# Patient Record
Sex: FEMALE | Race: WHITE | Employment: FULL TIME | URBAN - METROPOLITAN AREA
[De-identification: names, ages, dates, MRNs, and addresses within clinical notes are randomized per-mention and may not be internally consistent; named-entity substitution may affect disease eponyms.]

---

## 2021-03-17 ENCOUNTER — TRANSCRIBE ORDER (OUTPATIENT)
Dept: SCHEDULING | Age: 46
End: 2021-03-17

## 2021-03-17 DIAGNOSIS — Z12.31 VISIT FOR SCREENING MAMMOGRAM: Primary | ICD-10-CM

## 2021-04-09 ENCOUNTER — HOSPITAL ENCOUNTER (OUTPATIENT)
Dept: MAMMOGRAPHY | Age: 46
Discharge: HOME | End: 2021-04-09
Attending: FAMILY MEDICINE

## 2021-04-09 DIAGNOSIS — Z12.31 VISIT FOR SCREENING MAMMOGRAM: ICD-10-CM

## 2021-04-09 PROCEDURE — 77067 SCR MAMMO BI INCL CAD: CPT

## 2021-04-15 ENCOUNTER — HOSPITAL ENCOUNTER (OUTPATIENT)
Dept: MAMMOGRAPHY | Age: 46
Discharge: HOME | End: 2021-04-15
Attending: FAMILY MEDICINE

## 2021-04-15 DIAGNOSIS — R92.8 ABNORMAL SCREENING MAMMOGRAM: ICD-10-CM

## 2021-04-15 PROCEDURE — 77065 DX MAMMO INCL CAD UNI: CPT

## 2021-04-15 PROCEDURE — 76642 ULTRASOUND BREAST LIMITED: CPT

## 2021-09-09 ENCOUNTER — HOSPITAL ENCOUNTER (OUTPATIENT)
Dept: CT IMAGING | Age: 46
Discharge: HOME | End: 2021-09-09
Attending: STUDENT IN AN ORGANIZED HEALTH CARE EDUCATION/TRAINING PROGRAM
Payer: COMMERCIAL

## 2021-09-09 DIAGNOSIS — G43.909 MIGRAINE SYNDROME: ICD-10-CM

## 2021-09-09 DIAGNOSIS — J34.89 NASAL OBSTRUCTION: ICD-10-CM

## 2021-09-09 PROCEDURE — 70486 CT MAXILLOFACIAL W/O DYE: CPT

## 2021-09-24 DIAGNOSIS — J32.9 CHRONIC RHINOSINUSITIS: Primary | ICD-10-CM

## 2021-09-24 DIAGNOSIS — J34.2 DEVIATED NASAL SEPTUM: ICD-10-CM

## 2021-09-24 DIAGNOSIS — J34.89 NASAL OBSTRUCTION: ICD-10-CM

## 2021-09-24 DIAGNOSIS — J31.0 CHRONIC RHINOSINUSITIS: Primary | ICD-10-CM

## 2021-09-24 DIAGNOSIS — J34.3 NASAL TURBINATE HYPERTROPHY: ICD-10-CM

## 2021-10-01 ENCOUNTER — HOSPITAL ENCOUNTER (OUTPATIENT)
Dept: SURGERY | Age: 46
Discharge: HOME | End: 2021-10-01

## 2021-10-05 VITALS — WEIGHT: 150 LBS | BODY MASS INDEX: 26.58 KG/M2 | HEIGHT: 63 IN

## 2021-10-05 RX ORDER — CETIRIZINE HYDROCHLORIDE 10 MG/1
10 CAPSULE, LIQUID FILLED ORAL
COMMUNITY

## 2021-10-05 NOTE — PERIOP NOTES
Patient verified name and . Order for consent IS found in EHR and matches case posting; patient verifies procedure. Type 1B surgery, Phone assessment complete. Orders were received. Labs per surgeon: none  Labs per anesthesia protocol: none    Patient COVID test date 10/5/21 1145. The testing center is located at the [facility] [address] [place] [address]. If appointment is needed-patient provided telephone number of [phone]. Patient answered medical/surgical history questions at their best of ability. All prior to admission medications documented in [facility]. Patient instructed to take the following medications the day of surgery according to anesthesia guidelines with a small sip of water: flonase prn On the day before surgery please take Acetaminophen 1000mg in the morning and then again before bed. You may substitute for Tylenol 650 mg. Hold all vitamins 7 days prior to surgery and NSAIDS 5 days prior to surgery. Prescription meds to hold:none        Patient instructed on the following:    > Arrive at [facility], time of arrival to be called the day before by 1700  > NPO after midnight including gum, mints, and ice chips  > Responsible adult must drive patient to the hospital, stay during surgery, and patient will need supervision 24 hours after anesthesia  > Use antibacterial soap in shower the night before surgery and on the morning of surgery  > All piercings must be removed prior to arrival.    > Leave all valuables (money and jewelry) at home but bring insurance card and ID on DOS.   > Do not wear make-up, nail polish, lotions, cologne, perfumes, powders, or oil on skin. Artificial nails are not permitted.

## 2021-10-07 ENCOUNTER — ANESTHESIA EVENT (OUTPATIENT)
Dept: SURGERY | Age: 46
End: 2021-10-07
Payer: COMMERCIAL

## 2021-10-07 NOTE — ANESTHESIA PREPROCEDURE EVALUATION
Relevant Problems   No relevant active problems       Anesthetic History   No history of anesthetic complications            Review of Systems / Medical History  Patient summary reviewed and pertinent labs reviewed    Pulmonary  Within defined limits                 Neuro/Psych         Headaches (migraines)     Cardiovascular  Within defined limits                Exercise tolerance: >4 METS     GI/Hepatic/Renal  Within defined limits              Endo/Other  Within defined limits           Other Findings                   Anesthetic Plan    ASA: 1  Anesthesia type: general          Induction: Intravenous  Anesthetic plan and risks discussed with: Patient

## 2021-10-08 ENCOUNTER — HOSPITAL ENCOUNTER (OUTPATIENT)
Age: 46
Setting detail: OUTPATIENT SURGERY
Discharge: HOME | End: 2021-10-08
Attending: STUDENT IN AN ORGANIZED HEALTH CARE EDUCATION/TRAINING PROGRAM | Admitting: STUDENT IN AN ORGANIZED HEALTH CARE EDUCATION/TRAINING PROGRAM
Payer: COMMERCIAL

## 2021-10-08 ENCOUNTER — ANESTHESIA (OUTPATIENT)
Dept: SURGERY | Age: 46
End: 2021-10-08
Payer: COMMERCIAL

## 2021-10-08 VITALS
OXYGEN SATURATION: 98 % | TEMPERATURE: 97.3 F | HEART RATE: 71 BPM | DIASTOLIC BLOOD PRESSURE: 70 MMHG | HEIGHT: 63 IN | SYSTOLIC BLOOD PRESSURE: 137 MMHG | WEIGHT: 147 LBS | BODY MASS INDEX: 26.05 KG/M2 | RESPIRATION RATE: 16 BRPM

## 2021-10-08 DIAGNOSIS — J31.0 CHRONIC RHINOSINUSITIS: ICD-10-CM

## 2021-10-08 DIAGNOSIS — J32.9 CHRONIC RHINOSINUSITIS: ICD-10-CM

## 2021-10-08 DIAGNOSIS — J34.3 NASAL TURBINATE HYPERTROPHY: ICD-10-CM

## 2021-10-08 DIAGNOSIS — J34.89 NASAL OBSTRUCTION: ICD-10-CM

## 2021-10-08 DIAGNOSIS — J34.2 DEVIATED NASAL SEPTUM: ICD-10-CM

## 2021-10-08 PROCEDURE — 74011000250 HC RX REV CODE- 250: Performed by: NURSE ANESTHETIST, CERTIFIED REGISTERED

## 2021-10-08 PROCEDURE — 77030018390 HC SPNG HEMSTAT2 J&J -B: Performed by: STUDENT IN AN ORGANIZED HEALTH CARE EDUCATION/TRAINING PROGRAM

## 2021-10-08 PROCEDURE — 76060000035 HC ANESTHESIA 2 TO 2.5 HR: Performed by: STUDENT IN AN ORGANIZED HEALTH CARE EDUCATION/TRAINING PROGRAM

## 2021-10-08 PROCEDURE — 77030018836 HC SOL IRR NACL ICUM -A: Performed by: STUDENT IN AN ORGANIZED HEALTH CARE EDUCATION/TRAINING PROGRAM

## 2021-10-08 PROCEDURE — 77030040922 HC BLNKT HYPOTHRM STRY -A: Performed by: ANESTHESIOLOGY

## 2021-10-08 PROCEDURE — 30140 RESECT INFERIOR TURBINATE: CPT | Performed by: STUDENT IN AN ORGANIZED HEALTH CARE EDUCATION/TRAINING PROGRAM

## 2021-10-08 PROCEDURE — 77030037088 HC TUBE ENDOTRACH ORAL NSL COVD-A: Performed by: ANESTHESIOLOGY

## 2021-10-08 PROCEDURE — 77030008357 HC SPLNT NSL INT THOM -B: Performed by: STUDENT IN AN ORGANIZED HEALTH CARE EDUCATION/TRAINING PROGRAM

## 2021-10-08 PROCEDURE — 77030008528 HC TBNG IRR MIC/DEB MEDT -B: Performed by: STUDENT IN AN ORGANIZED HEALTH CARE EDUCATION/TRAINING PROGRAM

## 2021-10-08 PROCEDURE — 77030039425 HC BLD LARYNG TRULITE DISP TELE -A: Performed by: ANESTHESIOLOGY

## 2021-10-08 PROCEDURE — 74011000250 HC RX REV CODE- 250: Performed by: STUDENT IN AN ORGANIZED HEALTH CARE EDUCATION/TRAINING PROGRAM

## 2021-10-08 PROCEDURE — 74011250636 HC RX REV CODE- 250/636: Performed by: ANESTHESIOLOGY

## 2021-10-08 PROCEDURE — 77030041022 HC TRKR INSTR ENT NAV MEDT -C: Performed by: STUDENT IN AN ORGANIZED HEALTH CARE EDUCATION/TRAINING PROGRAM

## 2021-10-08 PROCEDURE — 31295 NSL/SINS NDSC SURG MAX SINS: CPT | Performed by: STUDENT IN AN ORGANIZED HEALTH CARE EDUCATION/TRAINING PROGRAM

## 2021-10-08 PROCEDURE — 74011250637 HC RX REV CODE- 250/637: Performed by: STUDENT IN AN ORGANIZED HEALTH CARE EDUCATION/TRAINING PROGRAM

## 2021-10-08 PROCEDURE — 61782 SCAN PROC CRANIAL EXTRA: CPT | Performed by: STUDENT IN AN ORGANIZED HEALTH CARE EDUCATION/TRAINING PROGRAM

## 2021-10-08 PROCEDURE — 77030040361 HC SLV COMPR DVT MDII -B: Performed by: STUDENT IN AN ORGANIZED HEALTH CARE EDUCATION/TRAINING PROGRAM

## 2021-10-08 PROCEDURE — 77030006907 HC BLD SNUS SHV MEDT -C: Performed by: STUDENT IN AN ORGANIZED HEALTH CARE EDUCATION/TRAINING PROGRAM

## 2021-10-08 PROCEDURE — 77030036727 HC DEV INFL BLN SNUS SE DISP ACCL -B: Performed by: STUDENT IN AN ORGANIZED HEALTH CARE EDUCATION/TRAINING PROGRAM

## 2021-10-08 PROCEDURE — 77030002888 HC SUT CHRMC J&J -A: Performed by: STUDENT IN AN ORGANIZED HEALTH CARE EDUCATION/TRAINING PROGRAM

## 2021-10-08 PROCEDURE — 76210000006 HC OR PH I REC 0.5 TO 1 HR: Performed by: STUDENT IN AN ORGANIZED HEALTH CARE EDUCATION/TRAINING PROGRAM

## 2021-10-08 PROCEDURE — 2709999900 HC NON-CHARGEABLE SUPPLY: Performed by: STUDENT IN AN ORGANIZED HEALTH CARE EDUCATION/TRAINING PROGRAM

## 2021-10-08 PROCEDURE — A4270 DISPOSABLE ENDOSCOPE SHEATH: HCPCS | Performed by: STUDENT IN AN ORGANIZED HEALTH CARE EDUCATION/TRAINING PROGRAM

## 2021-10-08 PROCEDURE — 74011250636 HC RX REV CODE- 250/636: Performed by: NURSE ANESTHETIST, CERTIFIED REGISTERED

## 2021-10-08 PROCEDURE — 77030019908 HC STETH ESOPH SIMS -A: Performed by: ANESTHESIOLOGY

## 2021-10-08 PROCEDURE — 77030002916 HC SUT ETHLN J&J -A: Performed by: STUDENT IN AN ORGANIZED HEALTH CARE EDUCATION/TRAINING PROGRAM

## 2021-10-08 PROCEDURE — 74011250637 HC RX REV CODE- 250/637: Performed by: ANESTHESIOLOGY

## 2021-10-08 PROCEDURE — 76010000131 HC OR TIME 2 TO 2.5 HR: Performed by: STUDENT IN AN ORGANIZED HEALTH CARE EDUCATION/TRAINING PROGRAM

## 2021-10-08 PROCEDURE — 31296 NSL/SINS NDSC SURG FRNT SINS: CPT | Performed by: STUDENT IN AN ORGANIZED HEALTH CARE EDUCATION/TRAINING PROGRAM

## 2021-10-08 PROCEDURE — 76210000020 HC REC RM PH II FIRST 0.5 HR: Performed by: STUDENT IN AN ORGANIZED HEALTH CARE EDUCATION/TRAINING PROGRAM

## 2021-10-08 PROCEDURE — 77030006908 HC BLD SNUS SHV MEDT -D: Performed by: STUDENT IN AN ORGANIZED HEALTH CARE EDUCATION/TRAINING PROGRAM

## 2021-10-08 PROCEDURE — 30520 REPAIR OF NASAL SEPTUM: CPT | Performed by: STUDENT IN AN ORGANIZED HEALTH CARE EDUCATION/TRAINING PROGRAM

## 2021-10-08 RX ORDER — PROPOFOL 10 MG/ML
INJECTION, EMULSION INTRAVENOUS AS NEEDED
Status: DISCONTINUED | OUTPATIENT
Start: 2021-10-08 | End: 2021-10-08 | Stop reason: HOSPADM

## 2021-10-08 RX ORDER — ONDANSETRON 4 MG/1
4 TABLET, ORALLY DISINTEGRATING ORAL
Status: COMPLETED | OUTPATIENT
Start: 2021-10-08 | End: 2021-10-08

## 2021-10-08 RX ORDER — LIDOCAINE HYDROCHLORIDE AND EPINEPHRINE 10; 10 MG/ML; UG/ML
INJECTION, SOLUTION INFILTRATION; PERINEURAL AS NEEDED
Status: DISCONTINUED | OUTPATIENT
Start: 2021-10-08 | End: 2021-10-08 | Stop reason: HOSPADM

## 2021-10-08 RX ORDER — EPHEDRINE SULFATE/0.9% NACL/PF 50 MG/5 ML
SYRINGE (ML) INTRAVENOUS AS NEEDED
Status: DISCONTINUED | OUTPATIENT
Start: 2021-10-08 | End: 2021-10-08 | Stop reason: HOSPADM

## 2021-10-08 RX ORDER — NEOSTIGMINE METHYLSULFATE 1 MG/ML
INJECTION, SOLUTION INTRAVENOUS AS NEEDED
Status: DISCONTINUED | OUTPATIENT
Start: 2021-10-08 | End: 2021-10-08 | Stop reason: HOSPADM

## 2021-10-08 RX ORDER — GLYCOPYRROLATE 0.2 MG/ML
INJECTION INTRAMUSCULAR; INTRAVENOUS AS NEEDED
Status: DISCONTINUED | OUTPATIENT
Start: 2021-10-08 | End: 2021-10-08 | Stop reason: HOSPADM

## 2021-10-08 RX ORDER — MIDAZOLAM HYDROCHLORIDE 1 MG/ML
2 INJECTION, SOLUTION INTRAMUSCULAR; INTRAVENOUS
Status: COMPLETED | OUTPATIENT
Start: 2021-10-08 | End: 2021-10-08

## 2021-10-08 RX ORDER — DEXAMETHASONE SODIUM PHOSPHATE 4 MG/ML
INJECTION, SOLUTION INTRA-ARTICULAR; INTRALESIONAL; INTRAMUSCULAR; INTRAVENOUS; SOFT TISSUE AS NEEDED
Status: DISCONTINUED | OUTPATIENT
Start: 2021-10-08 | End: 2021-10-08 | Stop reason: HOSPADM

## 2021-10-08 RX ORDER — LIDOCAINE HYDROCHLORIDE 20 MG/ML
INJECTION, SOLUTION EPIDURAL; INFILTRATION; INTRACAUDAL; PERINEURAL AS NEEDED
Status: DISCONTINUED | OUTPATIENT
Start: 2021-10-08 | End: 2021-10-08 | Stop reason: HOSPADM

## 2021-10-08 RX ORDER — HYDROMORPHONE HYDROCHLORIDE 2 MG/ML
0.5 INJECTION, SOLUTION INTRAMUSCULAR; INTRAVENOUS; SUBCUTANEOUS
Status: DISCONTINUED | OUTPATIENT
Start: 2021-10-08 | End: 2021-10-08 | Stop reason: HOSPADM

## 2021-10-08 RX ORDER — ROCURONIUM BROMIDE 10 MG/ML
INJECTION, SOLUTION INTRAVENOUS AS NEEDED
Status: DISCONTINUED | OUTPATIENT
Start: 2021-10-08 | End: 2021-10-08 | Stop reason: HOSPADM

## 2021-10-08 RX ORDER — SODIUM CHLORIDE, SODIUM LACTATE, POTASSIUM CHLORIDE, CALCIUM CHLORIDE 600; 310; 30; 20 MG/100ML; MG/100ML; MG/100ML; MG/100ML
75 INJECTION, SOLUTION INTRAVENOUS CONTINUOUS
Status: DISCONTINUED | OUTPATIENT
Start: 2021-10-08 | End: 2021-10-08 | Stop reason: HOSPADM

## 2021-10-08 RX ORDER — ONDANSETRON 2 MG/ML
INJECTION INTRAMUSCULAR; INTRAVENOUS AS NEEDED
Status: DISCONTINUED | OUTPATIENT
Start: 2021-10-08 | End: 2021-10-08 | Stop reason: HOSPADM

## 2021-10-08 RX ORDER — HYDROCODONE BITARTRATE AND ACETAMINOPHEN 5; 325 MG/1; MG/1
2 TABLET ORAL AS NEEDED
Status: DISCONTINUED | OUTPATIENT
Start: 2021-10-08 | End: 2021-10-08 | Stop reason: HOSPADM

## 2021-10-08 RX ORDER — FENTANYL CITRATE 50 UG/ML
100 INJECTION, SOLUTION INTRAMUSCULAR; INTRAVENOUS ONCE
Status: DISCONTINUED | OUTPATIENT
Start: 2021-10-08 | End: 2021-10-08 | Stop reason: HOSPADM

## 2021-10-08 RX ORDER — LIDOCAINE HYDROCHLORIDE 10 MG/ML
0.1 INJECTION INFILTRATION; PERINEURAL AS NEEDED
Status: DISCONTINUED | OUTPATIENT
Start: 2021-10-08 | End: 2021-10-08 | Stop reason: HOSPADM

## 2021-10-08 RX ORDER — OXYCODONE HYDROCHLORIDE 5 MG/1
5 TABLET ORAL
Status: COMPLETED | OUTPATIENT
Start: 2021-10-08 | End: 2021-10-08

## 2021-10-08 RX ORDER — OXYMETAZOLINE HCL 0.05 %
SPRAY, NON-AEROSOL (ML) NASAL AS NEEDED
Status: DISCONTINUED | OUTPATIENT
Start: 2021-10-08 | End: 2021-10-08 | Stop reason: HOSPADM

## 2021-10-08 RX ORDER — FENTANYL CITRATE 50 UG/ML
INJECTION, SOLUTION INTRAMUSCULAR; INTRAVENOUS AS NEEDED
Status: DISCONTINUED | OUTPATIENT
Start: 2021-10-08 | End: 2021-10-08 | Stop reason: HOSPADM

## 2021-10-08 RX ADMIN — ROCURONIUM BROMIDE 35 MG: 10 INJECTION, SOLUTION INTRAVENOUS at 07:39

## 2021-10-08 RX ADMIN — LIDOCAINE HYDROCHLORIDE 5 MG: 20 INJECTION, SOLUTION EPIDURAL; INFILTRATION; INTRACAUDAL; PERINEURAL at 07:39

## 2021-10-08 RX ADMIN — Medication 3 MG: at 09:39

## 2021-10-08 RX ADMIN — FENTANYL CITRATE 50 MCG: 50 INJECTION INTRAMUSCULAR; INTRAVENOUS at 08:52

## 2021-10-08 RX ADMIN — PROPOFOL 200 MG: 10 INJECTION, EMULSION INTRAVENOUS at 07:39

## 2021-10-08 RX ADMIN — SODIUM CHLORIDE, SODIUM LACTATE, POTASSIUM CHLORIDE, AND CALCIUM CHLORIDE 75 ML/HR: 600; 310; 30; 20 INJECTION, SOLUTION INTRAVENOUS at 06:12

## 2021-10-08 RX ADMIN — ONDANSETRON 4 MG: 4 TABLET, ORALLY DISINTEGRATING ORAL at 11:08

## 2021-10-08 RX ADMIN — SODIUM CHLORIDE, SODIUM LACTATE, POTASSIUM CHLORIDE, AND CALCIUM CHLORIDE: 600; 310; 30; 20 INJECTION, SOLUTION INTRAVENOUS at 09:22

## 2021-10-08 RX ADMIN — Medication 10 MG: at 08:14

## 2021-10-08 RX ADMIN — Medication 5 MG: at 08:10

## 2021-10-08 RX ADMIN — DEXAMETHASONE SODIUM PHOSPHATE 8 MG: 4 INJECTION, SOLUTION INTRAMUSCULAR; INTRAVENOUS at 07:45

## 2021-10-08 RX ADMIN — Medication 10 MG: at 08:42

## 2021-10-08 RX ADMIN — OXYCODONE 5 MG: 5 TABLET ORAL at 10:13

## 2021-10-08 RX ADMIN — ONDANSETRON 4 MG: 2 INJECTION INTRAMUSCULAR; INTRAVENOUS at 08:53

## 2021-10-08 RX ADMIN — MIDAZOLAM 2 MG: 1 INJECTION INTRAMUSCULAR; INTRAVENOUS at 07:02

## 2021-10-08 RX ADMIN — HYDROMORPHONE HYDROCHLORIDE 0.5 MG: 2 INJECTION, SOLUTION INTRAMUSCULAR; INTRAVENOUS; SUBCUTANEOUS at 10:13

## 2021-10-08 RX ADMIN — FENTANYL CITRATE 50 MCG: 50 INJECTION INTRAMUSCULAR; INTRAVENOUS at 07:39

## 2021-10-08 RX ADMIN — GLYCOPYRROLATE 0.4 MG: 0.2 INJECTION, SOLUTION INTRAMUSCULAR; INTRAVENOUS at 09:39

## 2021-10-08 RX ADMIN — Medication 5 MG: at 08:06

## 2021-10-08 NOTE — OP NOTES
[address] [address]  OPERATIVE REPORT    Name:  Mary Jane Jackson  MR#:  [id number]  :  1975  ACCOUNT #:  [de-identified]  DATE OF SERVICE:  10/08/2021    PREOPERATIVE DIAGNOSES:  Nasal obstruction, deviated nasal septum, turbinate hypertrophy, chronic rhinosinusitis. POSTOPERATIVE DIAGNOSES:  Nasal obstruction, deviated nasal septum, turbinate hypertrophy, chronic rhinosinusitis. PROCEDURES PERFORMED:  Septoplasty, bilateral submucous resection of the inferior turbinates, bilateral functional endoscopic sinus surgery with image navigation, bilateral frontal balloon sinuplasty, bilateral maxillary balloon sinuplasty. SURGEON:  Lex Carrero DO    ASSISTANT SURGEON:  None. ANESTHESIA:  General anesthesia with endotracheal tube, 8 mL of 1% lidocaine with 1:100,000 epinephrine injection. COMPLICATIONS:  None. SPECIMENS REMOVED:  None. IMPLANTS:  none. ESTIMATED BLOOD LOSS:  20 mL. FINDINGS:  Significant anterior septal columellar deflection to the left and posterior bony deviation to the right. Bilateral congestion, narrowing, and edema of the middle meatus and frontal recess. DISPOSITION:  Stable to PACU. INDICATION FOR PROCEDURE:  This is a 78-year-old female with very longstanding history of nasal airway obstruction which has been worsening over the last several years and has not improved with medical therapy. She has also had chronic sinus pain, pressure, and previous history of recurrent sinusitis. She was deemed to have a significant anterior septal deviation and turbinate hypertrophy and some mild inflammatory edematous changes noted to her maxillary outflow tracts, infundibulums, and frontal recesses on her CT imaging. Therefore, all risks, benefits, and alternatives to surgical intervention were reviewed. Informed consent was obtained and signed. She was scheduled for the operating room.     DESCRIPTION OF PROCEDURE:  The patient was brought from the preoperative waiting area to the operating room and laid supine on the operating room table by the anesthesia team.  General anesthesia was induced. Endotracheal tube was placed. A time-out was then performed. She was then prepped and draped in the usual sterile fashion. A total of 8 mL of 1% lidocaine with 1:100,000 epinephrine was injected into the bilateral nasal septal mucosa and Afrin-soaked nasal pledgets were placed into the bilateral nasal cavities and after sufficient amount of time, inspection of the left nostril revealed a significant deflection of the columella cartilage to the left side with nasal airway obstruction. Therefore, a hemitransfixion incision was obtained over the anterior most aspect of the quadrangular cartilage and columella using a 15 blade. This was taken down to the level of the submucoperichondrium where the entire anterior portion of the columella cartilage was skeletonized and visualized and the anterior 2-3 mm worth of cartilaginous tissue was then cut with a straight scissor and 15 blade scalpel and trimmed off in appropriate segments to ensure there was enough residual columellar cartilage remaining as a strut to support the nasal cavity vault and tip of the nose. Next, a continuation of the submucoperichondrium was raised on the left side using a Avtar elevator beyond the bony cartilaginous junction. Next, a cut was then made through the quadrangular cartilage using the 15 blade ensuring at least 1 cm anterior strut and a submucoperichondrial plane was then raised on the right side using the [place] elevator. Next, the nasal speculum was placed on either side of the nasal septum quadrangular cartilage and a wide portion of cartilage was removed with Ashwin double action forceps and Almas forceps.   Next, the bony deflection towards the right was identified at the vomer and perpendicular plane of the ethmoid bone which was trimmed in a piece-wise fashion with Ashwin double action forceps and removed with Donaldo Patricio forceps. Next, crest cartilage and bone was then removed with Cande Garter and mallet and chisel. Next, re-evaluation showed that there was significant improvement in the nasal airways bilaterally. At this time, endoscopic visualization was then performed to the bilateral nasal cavities which showed early inflammatory changes and congestion of the bilateral middle meatus and uncinate processes first directed on the right side. The uncinate process, infundibulum, and maxillary ostium location was confirmed under image navigation and the sinonasal ostial dilation equipment device was brought into the surgical field and appropriately placed into the right maxillary sinus infundibulum where the tracer probe was identified and confirmed to be within the maxillary cheek sinus on transcutaneous illumination. Next, the balloon was advanced and increased pressure up to 1200 mmHg for 2 seconds and relieved. Next, several hundred milliliters of sterile saline were then used to irrigate the right maxillary sinus and the balloon device was then removed. Next, very similarly on the left side, the sinonasal balloon device equipment was brought into the surgical field on the left and the left maxillary infundibulum had congestion, narrowing with uncinate process, inflammation and the maxillary infundibulum was confirmed under image navigation and the balloon wire tracer was advanced within the infundibulum and confirmed the transilluminate within the left maxillary sinus and the balloon was advanced and dilated up to sufficient pressure for 2 seconds and relieved. Next, several hundred milliliters of sterile saline were then used to irrigate the left maxillary sinus. The sinonasal balloon device was then removed.   Next, very similarly on both sides, the frontal balloon device was brought into the surgical field and the bilateral frontal and ethmoid recesses were confirmed under image  navigation and the balloon was then advanced through the recess and into the frontal sinuses. The transcutaneous tracer illuminated to be confirmed within the frontal sinus forehead on both sides. The balloon was then brought up to a sufficient pressure and held for 2 seconds and relieved and several hundred milliliter of sterile saline were then used to irrigate the bilateral frontal sinuses. Next, the balloon device was brought on the surgical field and re-evaluation was performed which revealed sufficient dilated ostium to the bilateral maxillary sinuses and frontal recesses. Next the anterior face of the bilateral inferior turbinates was approached with a single stab incision of a 15 blade and a submucosal plane was raised with a caudal elevator. Then into the bilateral inferior turbinates a submucosal volumetric tissue reduction was performed using the small microdebrider. There was a significant improvement to the nasal airways bilaterally. There is outfracture then performed to the inferior turbinates using a Anna elevator. Next, the central septal pocket was then addressed, cleared of any blood clots and the left hemitransfixion incision was closed being reapproximated with a simple interrupted 4-0 chromic suture. Next, the septal mucosal pockets were reapproximated using a running 4-0 plain gut suture in a mattress style fashion. Next, Mario splints coated in antibiotic ointment were placed on either side of the nasal septum and sutured into place with a single transcolumellar Prolene suture. At this time, the care of the patient was transferred back over to Anesthesia where she awoke from anesthesia with no complications and was extubated successfully. She was then transferred to PACU in stable condition.       [address] [place] [address]      [id number]  D:  10/08/2021 11:38  T:  10/08/2021 17:24  JOB #:  [id number]

## 2021-10-08 NOTE — ANESTHESIA POSTPROCEDURE EVALUATION
Procedure(s):  SEPTOPLASTY  BILATERAL SUBMUCOUS RESECTION INFERIOR TURBINATES  BILATERAL FUNCTIONAL  ENDOSCOPIC SINUS SURGERY WITH IMAGE NAVIGATION  BILATERAL MAXILLARY BALLOON SINUPLASTY, BILATERAL FRONTAL BALLOON SINUPLASTY.     general    Anesthesia Post Evaluation      Multimodal analgesia: multimodal analgesia used between 6 hours prior to anesthesia start to PACU discharge  Patient location during evaluation: bedside  Patient participation: complete - patient participated  Level of consciousness: awake and alert  Pain management: adequate  Airway patency: patent  Anesthetic complications: no  Cardiovascular status: acceptable  Respiratory status: acceptable  Hydration status: acceptable  Post anesthesia nausea and vomiting:  controlled  Final Post Anesthesia Temperature Assessment:  Normothermia (36.0-37.5 degrees C)      INITIAL Post-op Vital signs:   Vitals Value Taken Time   /70 10/08/21 1042   Temp 36.3 °C (97.3 °F) 10/08/21 0956   Pulse 71 10/08/21 1042   Resp 16 10/08/21 1042   SpO2 98 % 10/08/21 1042

## 2021-10-08 NOTE — DISCHARGE INSTRUCTIONS
Nasal/Sinus Surgery    Post-op Instructions:    No blowing your nose for 1 week. No exercising, high exertion, heavy lifting, bending over for 1 week  You have splints in your nose and you will need to keep your nose moist with saline spray at minimum every 2-3 hours while you are awake. Do not blow your nose after using saline. You can use a cloth rag or towel to gently dab your nose. Sleep next to a coolmist humidifier for the next couple weeks. Call our office number if you have continuous bleeding from your nose or mouth: [phone]  You have antibiotic to take for the next 5 days  You should begin taking Tylenol for pain every 6-8 hours starting today and if needed you can take your prescribed narcotic pain medications in place of the Tylenol if your pain becomes severe. No driving while taking narcotic pain medications  You should follow-up in the office in 1 week to remove your splints. -If needed after surgery, sleep and rest with your head elevated to decrease swelling and pressure.    -The first day or two after surgery, you may have some mild bloody drainage. You can wear a gauze pad under your nose to catch these drippings. This can be changed as needed. CALL THE OFFICE IF YOU HAVE TO CHANGE THE GAUZE MORE THAN 3 TIMES IN 30 MINUTES. -Numbness to the front teeth, roof of mouth is normal after nasal surgery, and will resolve after about 8 weeks. -VERY IMPORTANT. Nasal irrigation with a normal saline rinse after surgery is important after surgery. This is begun the day of surgery. You cannot do this too much. At a minimum of 5 time a day. -You may have splints in your nose; these need to be kept clean. Irrigating the nose helps keep them clean and open.    -Nasal Packing is generally not used. -You may irrigate blood clots from your nose for up to 2 weeks after surgery. IRRIGATION IS VERY IMPORTANT.   You may use additionally use Afrin 2-3 times daily for the first 3 days to decrease congestion and bleeding.      -You will be on an oral antibiotic and oral steroid for one week after surgery.     -Use the nebulizer with the antibiotic and steroid capsule(s) starting one week after surgery.    -Do not blow your nose until you've had your post-operative visit and gotten clearance from your doctor.    -If you need to sneeze, sneeze with your mouth open.    -No Aspirin or Ibuprofen for one week after surgery, unless approved by your doctor.    -Nasal congestion after nasal surgery is normal and will usually resolve once your splints are removed. Keep in mind, this is not immediate as you will still be swollen and healing. INSTRUCTIONS FOLLOWING SINUS SURGERY    ACTIVITY   Bathe or shower as directed by your doctor.  Avoid strenuous work and becoming overheated. Activity as directed by your doctor   Avoid bending over. DIET   Clear, cool liquids the first day; then soft diet the second day   Advance to regular diet on third day, unless your doctor orders otherwise.  No milk products or foods with red color dyes   If nausea and vomiting continues, call your doctor. PAIN   Take pain medication as directed by your doctor.  Call your doctor if pain is NOT relieved by medication.  DO NOT take aspirin or blood thinners until directed by your doctor. FOLLOW-UP PHONE [address] will be made by nursing staff   If you have any problems, call your doctor as needed. CALL YOUR DOCTOR IF   Bleeding is expected the first few days and should gradually clear.  Temperature of 10 1°F or above   Green or yellow discharge from nose   Stiff neck, changes in vision or mental status, confusion, or excessive drowsiness    MEDICATION INTERACTION:  During your procedure you potentially received a medication or medications which may reduce the effectiveness of oral contraceptives.  Please consider other forms of contraception for 1 month following your procedure if you are currently using oral contraceptives as your primary form of birth control. In addition to this, we recommend continuing your oral contraceptive as prescribed, unless otherwise instructed by your physician, during this time    After general anesthesia or intravenous sedation, for 24 hours or while taking prescription Narcotics:  · Limit your activities  · A responsible adult needs to be with you for the next 24 hours  · Do not drive and operate hazardous machinery  · Do not make important personal or business decisions  · Do not drink alcoholic beverages  · If you have not urinated within 8 hours after discharge, and you are experiencing discomfort from urinary retention, please go to the nearest ED. · If you have sleep apnea and have a CPAP machine, please use it for all naps and sleeping. · Please use caution when taking narcotics and any of your home medications that may cause drowsiness. *  Please give a list of your current medications to your Primary Care Provider. *  Please update this list whenever your medications are discontinued, doses are      changed, or new medications (including over-the-counter products) are added. *  Please carry medication information at all times in case of emergency situations. These are general instructions for a healthy lifestyle:  No smoking/ No tobacco products/ Avoid exposure to second hand smoke  Surgeon General's Warning:  Quitting smoking now greatly reduces serious risk to your health. Obesity, smoking, and sedentary lifestyle greatly increases your risk for illness  A healthy diet, regular physical exercise & weight monitoring are important for maintaining a healthy lifestyle    You may be retaining fluid if you have a history of heart failure or if you experience any of the following symptoms:  Weight gain of 3 pounds or more overnight or 5 pounds in a week, increased swelling in our hands or feet or shortness of breath while lying flat in bed.   Please call your doctor as soon as you notice any of these symptoms; do not wait until your next office visit.      -If needed after surgery, sleep and rest with your head elevated to decrease swelling and pressure.    -The first day or two after surgery, you may have some mild bloody drainage. You can wear a gauze pad under your nose to catch these drippings. This can be changed as needed. CALL THE OFFICE IF YOU HAVE TO CHANGE THE GAUZE MORE THAN 3 TIMES IN 30 MINUTES. -Numbness to the front teeth, roof of mouth is normal after nasal surgery, and will resolve after about 8 weeks. -VERY IMPORTANT. Nasal irrigation with a normal saline rinse after surgery is important after surgery. This is begun the day of surgery. You cannot do this too much. At a minimum of 5 time a day. -You may have splints in your nose; these need to be kept clean. Irrigating the nose helps keep them clean and open.    -Nasal Packing is generally not used. -You may irrigate blood clots from your nose for up to 2 weeks after surgery. IRRIGATION IS VERY IMPORTANT. You may use additionally use Afrin 2-3 times daily for the first 3 days to decrease congestion and bleeding.      -You will be on an oral antibiotic and oral steroid for one week after surgery.     -Use the nebulizer with the antibiotic and steroid capsule(s) starting one week after surgery.    -Do not blow your nose until you've had your post-operative visit and gotten clearance from your doctor.    -If you need to sneeze, sneeze with your mouth open.    -No Aspirin or Ibuprofen for one week after surgery, unless approved by your doctor.    -Nasal congestion after nasal surgery is normal and will usually resolve once your splints are removed. Keep in mind, this is not immediate as you will still be swollen and healing.     MEDICATION INTERACTION:  During your procedure you potentially received a medication or medications which may reduce the effectiveness of oral contraceptives. Please consider other forms of contraception for 1 month following your procedure if you are currently using oral contraceptives as your primary form of birth control. In addition to this, we recommend continuing your oral contraceptive as prescribed, unless otherwise instructed by your physician, during this time    After general anesthesia or intravenous sedation, for 24 hours or while taking prescription Narcotics:  · Limit your activities  · A responsible adult needs to be with you for the next 24 hours  · Do not drive and operate hazardous machinery  · Do not make important personal or business decisions  · Do not drink alcoholic beverages  · If you have not urinated within 8 hours after discharge, and you are experiencing discomfort from urinary retention, please go to the nearest ED. · If you have sleep apnea and have a CPAP machine, please use it for all naps and sleeping. · Please use caution when taking narcotics and any of your home medications that may cause drowsiness. *  Please give a list of your current medications to your Primary Care Provider. *  Please update this list whenever your medications are discontinued, doses are      changed, or new medications (including over-the-counter products) are added. *  Please carry medication information at all times in case of emergency situations. These are general instructions for a healthy lifestyle:  No smoking/ No tobacco products/ Avoid exposure to second hand smoke  Surgeon General's Warning:  Quitting smoking now greatly reduces serious risk to your health.   Obesity, smoking, and sedentary lifestyle greatly increases your risk for illness  A healthy diet, regular physical exercise & weight monitoring are important for maintaining a healthy lifestyle    You may be retaining fluid if you have a history of heart failure or if you experience any of the following symptoms:  Weight gain of 3 pounds or more overnight or 5 pounds in a week, increased swelling in our hands or feet or shortness of breath while lying flat in bed. Please call your doctor as soon as you notice any of these symptoms; do not wait until your next office visit.

## (undated) DEVICE — SPLINT NSL DISSOLVABLE 1.5X50 MM INTNSL CHITOSAN CHITOZOLVE

## (undated) DEVICE — SPLINT NSL SEPTAL SUPP REG PRE PUNCHED HOLE SIL STRL BRTH EZ

## (undated) DEVICE — Device

## (undated) DEVICE — KIT,ANTI FOG,W/SPONGE & FLUID,SOFT PACK: Brand: MEDLINE

## (undated) DEVICE — SPEAR SURG TRIANG SHP HNDL PCH WECK-CEL

## (undated) DEVICE — DEVICE INFL PRSS G INDIC DISP (MUST BE PURC IN MULTIPLES OF 5)

## (undated) DEVICE — 2000CC GUARDIAN II: Brand: GUARDIAN

## (undated) DEVICE — SOLUTION IV 1000ML 0.9% SOD CHL

## (undated) DEVICE — TUBING, SUCTION, 1/4" X 10', STRAIGHT: Brand: MEDLINE

## (undated) DEVICE — INSTRUMENT TRACKER 9733533XOM ENT 1PK

## (undated) DEVICE — GARMENT,MEDLINE,DVT,INT,CALF,MED, GEN2: Brand: MEDLINE

## (undated) DEVICE — SHEATH 1912000 5PK 4MM/0DEG STORZ XOMED: Brand: ENDO-SCRUB®

## (undated) DEVICE — TUBING 1895522 5PK STRAIGHTSHOT TO XPS: Brand: STRAIGHTSHOT®

## (undated) DEVICE — SOLUTION LACTATED RINGERS INJECTION USP

## (undated) DEVICE — SUT ETHLN 3-0 18IN PS1 BLK --

## (undated) DEVICE — SYR 50ML LR LCK 1ML GRAD NSAF --

## (undated) DEVICE — BLADE 1882040 5PK INFERIOR TURB 2MM

## (undated) DEVICE — AGENT HEMSTAT W1XL2IN ABSRB SFT LTWT LAYERED ORC FIBRILLAR

## (undated) DEVICE — PATIENT TRACKER 9734887XOM NON-INVASIVE

## (undated) DEVICE — SYSTEM SINUPLASTY BAL L16MM DIA6MM SPINPLUS NAV RELIEVA

## (undated) DEVICE — BLADE 1884080EM TRICUT 4MMX13CM M4 ROHS: Brand: FUSION®

## (undated) DEVICE — KIT PROCEDURE SURG HEAD AND NECK TOTE

## (undated) DEVICE — REM POLYHESIVE ADULT PATIENT RETURN ELECTRODE: Brand: VALLEYLAB

## (undated) DEVICE — SUT CHRMC 4-0 27IN RB1 BRN --

## (undated) DEVICE — CODMAN® SURGICAL PATTIES 1" X 3" (2.54CM X 7.62CM): Brand: CODMAN®